# Patient Record
Sex: FEMALE | Race: WHITE | NOT HISPANIC OR LATINO | URBAN - METROPOLITAN AREA
[De-identification: names, ages, dates, MRNs, and addresses within clinical notes are randomized per-mention and may not be internally consistent; named-entity substitution may affect disease eponyms.]

---

## 2023-06-24 PROBLEM — S72.002A CLOSED FRACTURE OF LEFT HIP: Status: ACTIVE | Noted: 2023-06-24

## 2023-06-24 PROBLEM — S72.142A CLOSED FRACTURE OF FEMUR, INTERTROCHANTERIC, LEFT, INITIAL ENCOUNTER: Status: ACTIVE | Noted: 2023-06-24

## 2023-06-24 PROBLEM — Z71.89 ADVANCE CARE PLANNING: Status: ACTIVE | Noted: 2023-06-24

## 2023-06-26 PROBLEM — I10 HYPERTENSION: Status: ACTIVE | Noted: 2023-06-26

## 2023-06-26 PROBLEM — N17.9 AKI (ACUTE KIDNEY INJURY): Status: ACTIVE | Noted: 2023-06-26

## 2023-06-26 PROBLEM — S72.002A CLOSED FRACTURE OF LEFT HIP: Status: RESOLVED | Noted: 2023-06-24 | Resolved: 2023-06-26

## 2023-06-26 PROBLEM — E03.9 HYPOTHYROIDISM: Status: ACTIVE | Noted: 2023-06-26

## 2023-06-27 PROBLEM — E55.9 VITAMIN D DEFICIENCY: Status: ACTIVE | Noted: 2023-06-27

## 2023-07-31 ENCOUNTER — TELEPHONE (OUTPATIENT)
Dept: SURGERY | Facility: CLINIC | Age: 88
End: 2023-07-31
Payer: MEDICARE

## 2023-07-31 NOTE — TELEPHONE ENCOUNTER
I called and spoke to Juli @ [facility] to schedule patient with Dr. Llamas on Tuesday, 8/8/23 @ 9:30am for a RIH.  Michael

## 2023-07-31 NOTE — TELEPHONE ENCOUNTER
----- Message from Veronica Kirkland sent at 7/31/2023 11:49 AM CDT -----  Type:  Sooner Appointment Request    Caller is requesting a sooner appointment.  Caller declined first available appointment listed below.  Caller will not accept being placed on the waitlist and is requesting a message be sent to doctor.    Name of Caller:  leila stock   When is the first available appointment?  unk  Symptoms:  hernia   Best Call Back Number: [phone]  Additional Information:  requesting a call back and appt please advise thank you

## 2023-08-08 ENCOUNTER — OFFICE VISIT (OUTPATIENT)
Dept: SURGERY | Facility: CLINIC | Age: 88
End: 2023-08-08
Payer: MEDICARE

## 2023-08-08 VITALS
HEART RATE: 90 BPM | SYSTOLIC BLOOD PRESSURE: 131 MMHG | HEIGHT: 60 IN | BODY MASS INDEX: 23.16 KG/M2 | WEIGHT: 118 LBS | DIASTOLIC BLOOD PRESSURE: 68 MMHG | TEMPERATURE: 98 F

## 2023-08-08 DIAGNOSIS — K40.90 RIGHT INGUINAL HERNIA: Primary | ICD-10-CM

## 2023-08-08 PROCEDURE — 99999 PR PBB SHADOW E&M-EST. PATIENT-LVL III: ICD-10-PCS | Mod: PBBFAC,,, | Performed by: STUDENT IN AN ORGANIZED HEALTH CARE EDUCATION/TRAINING PROGRAM

## 2023-08-08 PROCEDURE — 99999 PR PBB SHADOW E&M-EST. PATIENT-LVL III: CPT | Mod: PBBFAC,,, | Performed by: STUDENT IN AN ORGANIZED HEALTH CARE EDUCATION/TRAINING PROGRAM

## 2023-08-08 PROCEDURE — 99204 PR OFFICE/OUTPT VISIT, NEW, LEVL IV, 45-59 MIN: ICD-10-PCS | Mod: S$PBB,,, | Performed by: STUDENT IN AN ORGANIZED HEALTH CARE EDUCATION/TRAINING PROGRAM

## 2023-08-08 PROCEDURE — 99204 OFFICE O/P NEW MOD 45 MIN: CPT | Mod: S$PBB,,, | Performed by: STUDENT IN AN ORGANIZED HEALTH CARE EDUCATION/TRAINING PROGRAM

## 2023-08-08 PROCEDURE — 99213 OFFICE O/P EST LOW 20 MIN: CPT | Mod: PBBFAC,PO | Performed by: STUDENT IN AN ORGANIZED HEALTH CARE EDUCATION/TRAINING PROGRAM

## 2023-09-25 PROBLEM — N17.9 AKI (ACUTE KIDNEY INJURY): Status: RESOLVED | Noted: 2023-06-26 | Resolved: 2023-09-25

## 2024-10-28 NOTE — PROGRESS NOTES
History & Physical    SUBJECTIVE:     History of Present Illness:  Patient is a 89 y.o. female presents with right groin bulging.  This is been present for many years.  She recently had a hip replacement.  She is wheelchair-bound.  She lives at a nursing home.  Since she is newly wheelchair-bound, this seems to be causing slightly more pain.  No signs of obstruction or strangulation.  No prior history of hernia repairs.  Chief Complaint   Patient presents with    Consult     [facility]       Review of patient's allergies indicates:   Allergen Reactions    Naproxen Nausea And Vomiting     Pharmacy stated the patient is allergic to this medication. Verified with patient via phone at 3:57pm on 11/15/18 and she verbally stated she forgot to inform us that this medication makes her sick....[facility]  Pharmacy stated the patient is allergic to this medication. Verified with patient via phone at 3:57pm on 11/15/18 and she verbally stated she forgot to inform us that this medication makes her sick....[facility]         Current Outpatient Medications   Medication Sig Dispense Refill    anastrozole (ARIMIDEX) 1 mg Tab Take 1 mg by mouth once daily.      carvediloL (COREG) 3.125 MG tablet Take 3.125 mg by mouth 2 (two) times daily.      paroxetine (PAXIL) 30 MG tablet Take 30 mg by mouth once daily.      polyethylene glycol (GLYCOLAX) 17 gram PwPk Take 17 g by mouth once daily.  0    SYNTHROID 75 mcg tablet Take 75 mcg by mouth once daily.      traMADoL (ULTRAM) 50 mg tablet Take 1 tablet (50 mg total) by mouth every 8 (eight) hours as needed for Pain. (Patient not taking: Reported on 8/8/2023) 30 tablet 0     No current facility-administered medications for this visit.       Past Medical History:   Diagnosis Date    Breast cancer     Depression     Hypertension      Past Surgical History:   Procedure Laterality Date    INTRAMEDULLARY RODDING OF TROCHANTER OF FEMUR Left 6/24/2023    Procedure: INSERTION, INTRAMEDULLARY SONIA, FEMUR, TROCHANTER;   Surgeon: Dann Reich II, MD;  Location: [facility];  Service: Orthopedics;  Laterality: Left;    MASTECTOMY       No family history on file.  Social History     Tobacco Use    Smoking status: Former     Current packs/day: 0.00     Types: Cigarettes    Smokeless tobacco: Never    Tobacco comments:     Quit smoking 40yrs ago        Review of Systems:  Review of Systems   Constitutional:  Negative for fever.   HENT: Negative.     Eyes: Negative.    Respiratory: Negative.     Cardiovascular: Negative.    Gastrointestinal:  Positive for abdominal distention and abdominal pain.   Endocrine: Negative.    Genitourinary:  Positive for pelvic pain.   Musculoskeletal: Negative.    Skin: Negative.    Allergic/Immunologic: Negative.    Neurological: Negative.    Hematological: Negative.    Psychiatric/Behavioral: Negative.         OBJECTIVE:     Vital Signs (Most Recent)  Temp: 98 °F (36.7 °C) (08/08/23 0934)  Pulse: 90 (08/08/23 0934)  BP: 131/68 (08/08/23 0934)  5' (1.524 m)  53.5 kg (118 lb)     Physical Exam:  Physical Exam  Constitutional:       General: She is not in acute distress.     Appearance: Normal appearance. She is not ill-appearing, toxic-appearing or diaphoretic.   HENT:      Head: Normocephalic.      Nose: Nose normal.   Eyes:      Conjunctiva/sclera: Conjunctivae normal.   Cardiovascular:      Rate and Rhythm: Normal rate and regular rhythm.   Pulmonary:      Effort: Pulmonary effort is normal.   Abdominal:      Palpations: Abdomen is soft.   Genitourinary:     Comments: Moderate-to-large right inguinal hernia  Musculoskeletal:         General: Normal range of motion.      Cervical back: Normal range of motion.   Skin:     General: Skin is warm.   Neurological:      General: No focal deficit present.      Mental Status: She is alert.   Psychiatric:         Mood and Affect: Mood normal.           Diagnostic Results:  CT scan of the left hip shows that there is likely a loop of bowel within a right inguinal  hernia though images are inconclusive    ASSESSMENT/PLAN:     89-year-old female who has wheelchair-bound and lives at a nursing home with a longstanding right inguinal hernia.  Since her hip fracture, this seems to be more symptomatic.  Patient denies obstructive symptoms or signs of strangulation.    PLAN:  Given the patient's age and functional status, recommended observation for now as symptoms do not seem to have changed significantly over the past few years.  She would also be at high risk of operative complications including anesthetic complications given her age.  She is also at high risk for recurrence given the anticipated tissue quality.  Overall, patient and her son elected for observation.  They will call the set up follow-up as needed.            Yes